# Patient Record
Sex: FEMALE | Race: WHITE | HISPANIC OR LATINO | URBAN - METROPOLITAN AREA
[De-identification: names, ages, dates, MRNs, and addresses within clinical notes are randomized per-mention and may not be internally consistent; named-entity substitution may affect disease eponyms.]

---
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
## 2023-08-10 ENCOUNTER — APPOINTMENT (RX ONLY)
Dept: URBAN - METROPOLITAN AREA CLINIC 164 | Facility: CLINIC | Age: 18
Setting detail: DERMATOLOGY
End: 2023-08-10

## 2023-08-10 DIAGNOSIS — L72.8 OTHER FOLLICULAR CYSTS OF THE SKIN AND SUBCUTANEOUS TISSUE: ICD-10-CM

## 2023-08-10 PROCEDURE — 99212 OFFICE O/P EST SF 10 MIN: CPT

## 2023-08-10 PROCEDURE — ? OBSERVATION AND MEASURE

## 2023-08-10 PROCEDURE — ? DEFER

## 2023-08-10 PROCEDURE — ? COUNSELING

## 2023-08-10 ASSESSMENT — LOCATION ZONE DERM: LOCATION ZONE: LEG

## 2023-08-10 ASSESSMENT — LOCATION SIMPLE DESCRIPTION DERM: LOCATION SIMPLE: LEFT THIGH

## 2023-08-10 ASSESSMENT — LOCATION DETAILED DESCRIPTION DERM: LOCATION DETAILED: LEFT POSTERIOR LATERAL PROXIMAL THIGH

## 2023-08-10 NOTE — PROCEDURE: DEFER
Detail Level: Zone
Size Of Lesion In Cm (Optional): 0
Instructions (Optional): Patient advised by the provider to RTC if she wishes to have this cyst excised in the future. Patient was counseled on removal of the cyst vs. not removing the cyst. Patient will decide and call the office with a decision.
Introduction Text (Please End With A Colon): The following procedure was deferred:

## 2023-08-18 ENCOUNTER — APPOINTMENT (RX ONLY)
Dept: URBAN - METROPOLITAN AREA CLINIC 164 | Facility: CLINIC | Age: 18
Setting detail: DERMATOLOGY
End: 2023-08-18

## 2023-08-18 DIAGNOSIS — B35.4 TINEA CORPORIS: ICD-10-CM

## 2023-08-18 PROCEDURE — 99213 OFFICE O/P EST LOW 20 MIN: CPT

## 2023-08-18 PROCEDURE — ? FULL BODY SKIN EXAM - DECLINED

## 2023-08-18 PROCEDURE — ? COUNSELING

## 2023-08-18 PROCEDURE — ? PRESCRIPTION

## 2023-08-18 RX ORDER — FLUCONAZOLE 150 MG/1
TABLET ORAL QWEEK
Qty: 4 | Refills: 0 | Status: ERX | COMMUNITY
Start: 2023-08-18

## 2023-08-18 RX ORDER — KETOCONAZOLE 20 MG/G
1 CREAM TOPICAL BID
Qty: 30 | Refills: 2 | Status: ERX | COMMUNITY
Start: 2023-08-18

## 2023-08-18 RX ADMIN — KETOCONAZOLE 1: 20 CREAM TOPICAL at 00:00

## 2023-08-18 RX ADMIN — FLUCONAZOLE: 150 TABLET ORAL at 00:00

## 2023-08-18 ASSESSMENT — LOCATION ZONE DERM: LOCATION ZONE: TRUNK

## 2023-08-18 ASSESSMENT — LOCATION SIMPLE DESCRIPTION DERM: LOCATION SIMPLE: CHEST

## 2023-08-18 ASSESSMENT — LOCATION DETAILED DESCRIPTION DERM: LOCATION DETAILED: RIGHT MEDIAL SUPERIOR CHEST

## 2023-08-18 NOTE — HPI: RASH
What Type Of Note Output Would You Prefer (Optional)?: Standard Output
Is The Patient Presenting As Previously Scheduled?: Yes
How Severe Is Your Rash?: moderate
Is This A New Presentation, Or A Follow-Up?: Rash
Additional History: Patient states she has had this rash on her chest for exactly one week. The patient states that the only contact outdoors was an event in [facility], also patient states that she has a recent new pet addition to the home.

## 2023-09-27 ENCOUNTER — APPOINTMENT (RX ONLY)
Dept: URBAN - METROPOLITAN AREA CLINIC 164 | Facility: CLINIC | Age: 18
Setting detail: DERMATOLOGY
End: 2023-09-27

## 2023-09-27 DIAGNOSIS — B35.4 TINEA CORPORIS: ICD-10-CM

## 2023-09-27 PROCEDURE — ? COUNSELING

## 2023-09-27 PROCEDURE — 99213 OFFICE O/P EST LOW 20 MIN: CPT

## 2023-09-27 PROCEDURE — ? PRESCRIPTION

## 2023-09-27 RX ORDER — CICLOPIROX OLAMINE 7.7 MG/G
CREAM TOPICAL
Qty: 30 | Refills: 2 | Status: ERX | COMMUNITY
Start: 2023-09-27

## 2023-09-27 RX ADMIN — CICLOPIROX OLAMINE: 7.7 CREAM TOPICAL at 00:00

## 2023-09-27 ASSESSMENT — LOCATION DETAILED DESCRIPTION DERM: LOCATION DETAILED: RIGHT MEDIAL SUPERIOR CHEST

## 2023-09-27 ASSESSMENT — LOCATION ZONE DERM: LOCATION ZONE: TRUNK

## 2023-09-27 ASSESSMENT — LOCATION SIMPLE DESCRIPTION DERM: LOCATION SIMPLE: CHEST
